# Patient Record
Sex: FEMALE | Race: WHITE | NOT HISPANIC OR LATINO | Employment: PART TIME | URBAN - METROPOLITAN AREA
[De-identification: names, ages, dates, MRNs, and addresses within clinical notes are randomized per-mention and may not be internally consistent; named-entity substitution may affect disease eponyms.]

---

## 2017-12-07 ENCOUNTER — OFFICE VISIT (OUTPATIENT)
Dept: ORTHOPEDIC SURGERY | Facility: CLINIC | Age: 42
End: 2017-12-07

## 2017-12-07 VITALS
HEART RATE: 64 BPM | BODY MASS INDEX: 44.36 KG/M2 | WEIGHT: 276 LBS | DIASTOLIC BLOOD PRESSURE: 84 MMHG | HEIGHT: 66 IN | SYSTOLIC BLOOD PRESSURE: 127 MMHG

## 2017-12-07 DIAGNOSIS — M25.512 ACUTE PAIN OF LEFT SHOULDER: Primary | ICD-10-CM

## 2017-12-07 PROCEDURE — 99203 OFFICE O/P NEW LOW 30 MIN: CPT | Performed by: ORTHOPAEDIC SURGERY

## 2017-12-07 RX ORDER — FUROSEMIDE 20 MG/1
TABLET ORAL
COMMUNITY
Start: 2017-12-05

## 2017-12-07 RX ORDER — MELOXICAM 15 MG/1
TABLET ORAL
COMMUNITY
Start: 2015-10-26 | End: 2018-01-10 | Stop reason: SDUPTHER

## 2017-12-07 RX ORDER — ERGOCALCIFEROL 1.25 MG/1
CAPSULE ORAL
COMMUNITY
Start: 2017-12-03 | End: 2020-03-24

## 2017-12-07 RX ORDER — FLUTICASONE PROPIONATE 50 MCG
SPRAY, SUSPENSION (ML) NASAL
COMMUNITY
Start: 2017-12-06 | End: 2020-03-24

## 2017-12-07 NOTE — PROGRESS NOTES
Subjective: Left shoulder pain     Patient ID: Linda Cardoza is a 42 y.o. female.    Chief Complaint:    History of Present Illness 42-year-old female is seen for left nondominant shoulder she's in she injured on-the-job back in October 25 of this year.  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  Was using the left arm more than the right arm.  She artery has nerve damage in that right upper extremity.  The event after this episode she's had persistent pain and discomfort in the shoulder.  Was seen initially at [facility] x-rayed and told she may have acromioclavicular separation but no treatment was rendered.  Since seen by her primary care physician and then referred here.  He is had no treatment since the injury she persists in having pain discomfort and is unable to perform all of her activities of daily living and work.  Has been restricted in her ability use the left upper extremity.       Social History     Occupational History   • Not on file.     Social History Main Topics   • Smoking status: Never Smoker   • Smokeless tobacco: Never Used   • Alcohol use No   • Drug use: Not on file   • Sexual activity: Not on file      Review of Systems   Constitutional: Negative for chills, diaphoresis, fever and unexpected weight change.   HENT: Negative for hearing loss, nosebleeds, sore throat and tinnitus.    Eyes: Negative for pain and visual disturbance.   Respiratory: Negative for cough, shortness of breath and wheezing.    Cardiovascular: Negative for chest pain and palpitations.   Gastrointestinal: Negative for abdominal pain, diarrhea, nausea and vomiting.   Endocrine: Negative for cold intolerance, heat intolerance and polydipsia.   Genitourinary: Negative for difficulty urinating, dysuria and hematuria.   Musculoskeletal: Positive for arthralgias. Negative for joint swelling and  myalgias.   Skin: Positive for wound. Negative for rash.   Allergic/Immunologic: Negative for environmental allergies.   Neurological: Positive for numbness. Negative for dizziness and syncope.   Hematological: Does not bruise/bleed easily.   Psychiatric/Behavioral: Negative for dysphoric mood and sleep disturbance. The patient is not nervous/anxious.    All other systems reviewed and are negative.        Past Medical History:   Diagnosis Date   • MRSA (methicillin resistant staph aureus) culture positive    • Obesity      Past Surgical History:   Procedure Laterality Date   •  SECTION     • CHOLECYSTECTOMY       Family History   Problem Relation Age of Onset   • Heart disease Father          Objective:  Vitals:    17 1432   BP: 127/84   Pulse: 64     Last 3 weights    17  1432   Weight: 125 kg (276 lb)     Body mass index is 44.55 kg/(m^2).       Ortho Exam  AP and oblique view of the left shoulder done at [facility] brought with her reviewed by me shows no acute or chronic changes.  The acromioclavicular joint does look slightly widened is no definite acute changes noted no prior x-rays are comparison.  She is alert and oriented ×3.  Head is normocephalic sclerae clear.  Left upper extremity has no motor deficit no sensory loss.  Good distal pulses good capillary refill.  No swelling is noted to the upper or lower arm.  She is full abduction and extension there is pain and discomfort with abduction and extension and 90° against resistance.  External rotation is limited to about 40°.  Internal rotation about 50°.  Skin is cool to touch.  Biceps function is 5 over 5.  Deltoid function is +3 out of 5 secondary to pain.  Negative [place]'s test.  Mildly positive Brizuela sign.    Assessment:       1. Acute pain of left shoulder          Plan:      placed in the patient's physical findings and x-ray findings and recommend an MRI.  Return to see me after that is been completed.  Continue the  same work restrictions until seen.      Work Status:    EPI query complete.    Orders:  Orders Placed This Encounter   Procedures   • MRI Shoulder Left Without Contrast       Medications:  New Medications Ordered This Visit   Medications   • gabapentin, once-daily, (GRALISE) 600 MG tablet tablet     Sig: Take  by mouth.   • meloxicam (MOBIC) 15 MG tablet     Sig: Take  by mouth.   • vitamin D (ERGOCALCIFEROL) 49776 units capsule capsule   • fluticasone (FLONASE) 50 MCG/ACT nasal spray   • furosemide (LASIX) 20 MG tablet       Followup:  Return in about 2 weeks (around 12/21/2017).          Dragon transcription disclaimer     Much of this encounter note is an electronic transcription/translation of spoken language to printed text. The electronic translation of spoken language may permit erroneous, or at times, nonsensical words or phrases to be inadvertently transcribed. Although I have reviewed the note for such errors, some may still exist.

## 2018-01-10 ENCOUNTER — OFFICE VISIT (OUTPATIENT)
Dept: ORTHOPEDIC SURGERY | Facility: CLINIC | Age: 43
End: 2018-01-10

## 2018-01-10 VITALS — BODY MASS INDEX: 45.29 KG/M2 | WEIGHT: 271.8 LBS | HEIGHT: 65 IN

## 2018-01-10 DIAGNOSIS — M75.42 IMPINGEMENT SYNDROME OF SHOULDER REGION, LEFT: ICD-10-CM

## 2018-01-10 DIAGNOSIS — M25.512 ACUTE PAIN OF LEFT SHOULDER: Primary | ICD-10-CM

## 2018-01-10 PROCEDURE — 99214 OFFICE O/P EST MOD 30 MIN: CPT | Performed by: ORTHOPAEDIC SURGERY

## 2018-01-10 PROCEDURE — 20610 DRAIN/INJ JOINT/BURSA W/O US: CPT | Performed by: ORTHOPAEDIC SURGERY

## 2018-01-10 RX ORDER — MELOXICAM 15 MG/1
15 TABLET ORAL DAILY
Qty: 30 TABLET | Refills: 5 | Status: SHIPPED | OUTPATIENT
Start: 2018-01-10 | End: 2018-07-17 | Stop reason: SDUPTHER

## 2018-01-10 RX ORDER — TRIAMCINOLONE ACETONIDE 40 MG/ML
40 INJECTION, SUSPENSION INTRA-ARTICULAR; INTRAMUSCULAR
Status: COMPLETED | OUTPATIENT
Start: 2018-01-10 | End: 2018-01-10

## 2018-01-10 RX ORDER — LIDOCAINE HYDROCHLORIDE 10 MG/ML
4 INJECTION, SOLUTION EPIDURAL; INFILTRATION; INTRACAUDAL; PERINEURAL
Status: COMPLETED | OUTPATIENT
Start: 2018-01-10 | End: 2018-01-10

## 2018-01-10 RX ORDER — HYDROCODONE BITARTRATE AND ACETAMINOPHEN 5; 325 MG/1; MG/1
1 TABLET ORAL EVERY 6 HOURS PRN
Qty: 24 TABLET | Refills: 0 | Status: SHIPPED | OUTPATIENT
Start: 2018-01-10 | End: 2020-03-24

## 2018-01-10 RX ADMIN — TRIAMCINOLONE ACETONIDE 40 MG: 40 INJECTION, SUSPENSION INTRA-ARTICULAR; INTRAMUSCULAR at 15:56

## 2018-01-10 RX ADMIN — LIDOCAINE HYDROCHLORIDE 4 ML: 10 INJECTION, SOLUTION EPIDURAL; INFILTRATION; INTRACAUDAL; PERINEURAL at 15:56

## 2018-01-10 NOTE — PROGRESS NOTES
Subjective: Left shoulder pain     Patient ID: Linda Cardoza is a 42 y.o. female.    Chief Complaint:    History of Present Illness 42-year-old female returns with results of the MRI done at an outside.  I personally reviewed the images and the report which shows impingement.  She has a type II acromion subacromial bursitis and tendinitis but no rotator cuff tear.  She persists in having moderate to severe pain and discomfort all activities particularly any overhead or lifting activity.  She stopped taking the meloxicam and she ran out of her.  She's had no other treatment to that shoulder since the injury back in October.       Social History     Occupational History   • Not on file.     Social History Main Topics   • Smoking status: Never Smoker   • Smokeless tobacco: Never Used   • Alcohol use No   • Drug use: No   • Sexual activity: Defer      Review of Systems   Constitutional: Negative for chills, diaphoresis, fever and unexpected weight change.   HENT: Negative for hearing loss, nosebleeds, sore throat and tinnitus.    Eyes: Negative for pain and visual disturbance.   Respiratory: Negative for cough, shortness of breath and wheezing.    Cardiovascular: Negative for chest pain and palpitations.   Gastrointestinal: Negative for abdominal pain, diarrhea, nausea and vomiting.   Endocrine: Negative for cold intolerance, heat intolerance and polydipsia.   Genitourinary: Negative for difficulty urinating, dysuria and hematuria.   Musculoskeletal: Positive for arthralgias and myalgias. Negative for joint swelling.   Skin: Negative for rash and wound.   Allergic/Immunologic: Negative for environmental allergies.   Neurological: Negative for dizziness, syncope and numbness.   Hematological: Does not bruise/bleed easily.   Psychiatric/Behavioral: Negative for dysphoric mood and sleep disturbance. The patient is not nervous/anxious.          Past Medical History:   Diagnosis Date   • MRSA (methicillin resistant  staph aureus) culture positive    • Obesity      Past Surgical History:   Procedure Laterality Date   •  SECTION     • CHOLECYSTECTOMY       Family History   Problem Relation Age of Onset   • Heart disease Father          Objective:  There were no vitals filed for this visit.  Last 3 weights    01/10/18  1538   Weight: 123 kg (271 lb 12.8 oz)     Body mass index is 45.23 kg/(m^2).       Ortho Exam  she is alert and oriented ×3.  I personally reviewed the MRI images and the report done at an outside facility which showed a type II acromion with impingement and bursitis and tendinitis but no rotator cuff tear.  Left upper extremity again shows no motor deficit good distal pulses.  Distal ulnar radial median function is intact.  No sensory loss.  The skin is cool to touch.  His no swelling erythema noted to the shoulder the skin.  Deltoid function is +4 out of 5 secondary to pain.  Biceps function is 5 over 5.  She has 170° of extension.  160° of abduction.  Abduction and extension against resistance at 90° is intact and mildly positive.  Positive Brizuela sign.  External rotation 40°.  Internal rotation 30°.  No swelling to the upper extremity indicating any type of vascular compromise.  His no pain with passive range of motion of her elbow shoulder or her wrist.  Tolerating meloxicam without any GI side effects    Assessment:       1. Acute pain of left shoulder    2. Impingement syndrome of shoulder region, left          Plan: I spent over 15 minutes with the patient reviewing her MRI report results and her physical findings.  Believe her dealing with impingement secondary to the type II acromion.  All of this is been aggravated by the work-related injury.  After discussing treatment options and proceed with an injection of the subacromial space with 4 cc lidocaine 1 cc Kenalog and that was accomplished through the posterior portal after sterile prep without complications tolerating that well.  Also will  begin physical therapy restart the meloxicam gave her prescription for low-dose hydrocodone for pain control.  She is able return to work with no use of the left upper extremity she is to return to see me in 3 weeks.  Discussed this treatment plan with the patient and she was in agreement  Large Joint Arthrocentesis  Date/Time: 1/10/2018 3:56 PM  Consent given by: patient  Site marked: site marked  Timeout: Immediately prior to procedure a time out was called to verify the correct patient, procedure, equipment, support staff and site/side marked as required   Supporting Documentation  Indications: pain   Procedure Details  Location: shoulder - L subacromial bursa  Preparation: Patient was prepped and draped in the usual sterile fashion  Needle size: 22 G  Approach: posterior  Medications administered: 4 mL lidocaine PF 1% 1 %; 40 mg triamcinolone acetonide 40 MG/ML  Patient tolerance: patient tolerated the procedure well with no immediate complications                  Work Status:    EPI query complete.    Orders:  Orders Placed This Encounter   Procedures   • Large Joint Arthrocentesis   • Ambulatory Referral to Physical Therapy Evaluate and treat       Medications:  New Medications Ordered This Visit   Medications   • HYDROcodone-acetaminophen (NORCO) 5-325 MG per tablet     Sig: Take 1 tablet by mouth Every 6 (Six) Hours As Needed for Moderate Pain  or Severe Pain .     Dispense:  24 tablet     Refill:  0   • meloxicam (MOBIC) 15 MG tablet     Sig: Take 1 tablet by mouth Daily.     Dispense:  30 tablet     Refill:  5       Followup:  Return in about 3 weeks (around 1/31/2018).          Dragon transcription disclaimer     Much of this encounter note is an electronic transcription/translation of spoken language to printed text. The electronic translation of spoken language may permit erroneous, or at times, nonsensical words or phrases to be inadvertently transcribed. Although I have reviewed the note for such  errors, some may still exist.

## 2018-03-07 ENCOUNTER — OFFICE VISIT (OUTPATIENT)
Dept: ORTHOPEDIC SURGERY | Facility: CLINIC | Age: 43
End: 2018-03-07

## 2018-03-07 DIAGNOSIS — M75.42 IMPINGEMENT SYNDROME OF SHOULDER REGION, LEFT: ICD-10-CM

## 2018-03-07 DIAGNOSIS — M25.512 ACUTE PAIN OF LEFT SHOULDER: Primary | ICD-10-CM

## 2018-03-07 PROCEDURE — 99213 OFFICE O/P EST LOW 20 MIN: CPT | Performed by: ORTHOPAEDIC SURGERY

## 2018-03-07 RX ORDER — PHENTERMINE HYDROCHLORIDE 30 MG/1
30 CAPSULE ORAL EVERY MORNING
COMMUNITY
End: 2020-03-24

## 2018-03-07 RX ORDER — POTASSIUM CHLORIDE 750 MG/1
TABLET, FILM COATED, EXTENDED RELEASE ORAL
COMMUNITY
Start: 2018-03-05 | End: 2020-03-24

## 2018-03-07 RX ORDER — HYDROCHLOROTHIAZIDE 50 MG/1
TABLET ORAL
COMMUNITY
Start: 2018-02-08

## 2018-03-07 RX ORDER — OMEPRAZOLE 20 MG/1
CAPSULE, DELAYED RELEASE ORAL
COMMUNITY
Start: 2018-01-31 | End: 2020-03-24

## 2018-03-07 RX ORDER — GABAPENTIN 600 MG/1
TABLET ORAL
COMMUNITY
Start: 2018-03-02

## 2018-03-07 RX ORDER — CITALOPRAM 10 MG/1
20 TABLET ORAL
COMMUNITY
Start: 2018-03-02 | End: 2020-03-24

## 2018-03-07 NOTE — PROGRESS NOTES
Subjective: Left shoulder pain     Patient ID: Linda Cardoza is a 42 y.o. female.    Chief Complaint:    History of Present Illness patient seen today for the first time since January regarding impingement of the left shoulder.  According to the patient she has not been seen prior to today because of multiple personal problems he states her shoulder is significantly improved causing minimal to no pain.  She has been taking the meloxicam as needed for discomfort.       Social History     Occupational History   • Not on file.     Social History Main Topics   • Smoking status: Never Smoker   • Smokeless tobacco: Never Used   • Alcohol use No   • Drug use: No   • Sexual activity: Defer      Review of Systems   Constitutional: Negative for chills, diaphoresis, fever and unexpected weight change.   HENT: Negative for hearing loss, nosebleeds, sore throat and tinnitus.    Eyes: Negative for pain and visual disturbance.   Respiratory: Negative for cough, shortness of breath and wheezing.    Cardiovascular: Negative for chest pain and palpitations.   Gastrointestinal: Negative for abdominal pain, diarrhea, nausea and vomiting.   Endocrine: Negative for cold intolerance, heat intolerance and polydipsia.   Genitourinary: Negative for difficulty urinating, dysuria and hematuria.   Musculoskeletal: Negative for arthralgias, joint swelling and myalgias.   Skin: Negative for rash and wound.   Allergic/Immunologic: Negative for environmental allergies.   Neurological: Negative for dizziness, syncope and numbness.   Hematological: Does not bruise/bleed easily.   Psychiatric/Behavioral: Negative for dysphoric mood and sleep disturbance. The patient is not nervous/anxious.    All other systems reviewed and are negative.          Objective:     Ortho Exam  she is alert and oriented ×3.  She has 180° of extension and 180° of abduction with a negative drop arm test.  Negative Brizuela sign.  External rotation is 45° and internal  rotation is 50°.  Deltoid function is 5 over 5 biceps function is 5 over 5.  Distal radial, ulnar and median nerve function intact in his no sensory loss.  She has good distal pulses no motor deficit.  His no swelling noted to the upper or lower arm indicating vascular compromise and she has good capillary refill.  The skin is cool to touch.  She can tolerating meloxicam without any GI side effects.    Assessment:       1. Acute pain of left shoulder    2. Impingement syndrome of shoulder region, left          Plan:        At this point the patient is fully recovered from her injury and at her request release in her for full duty with no restrictions as of today.  She will return to see me as needed regarding the shoulder.  She did discuss today that she is having some problems with her knees unrelated to any work injury and she'll schedule follow-up appointment regarding the knees.

## 2018-04-05 DIAGNOSIS — M25.512 ACUTE PAIN OF LEFT SHOULDER: ICD-10-CM

## 2018-07-17 RX ORDER — MELOXICAM 15 MG/1
TABLET ORAL
Qty: 30 TABLET | Refills: 4 | Status: SHIPPED | OUTPATIENT
Start: 2018-07-17 | End: 2018-12-27 | Stop reason: SDUPTHER

## 2018-12-27 RX ORDER — MELOXICAM 15 MG/1
TABLET ORAL
Qty: 30 TABLET | Refills: 3 | Status: SHIPPED | OUTPATIENT
Start: 2018-12-27 | End: 2019-09-12 | Stop reason: SDUPTHER

## 2019-02-06 ENCOUNTER — OFFICE VISIT (OUTPATIENT)
Dept: ORTHOPEDIC SURGERY | Facility: CLINIC | Age: 44
End: 2019-02-06

## 2019-02-06 VITALS — BODY MASS INDEX: 42.75 KG/M2 | WEIGHT: 266 LBS | HEIGHT: 66 IN

## 2019-02-06 DIAGNOSIS — M25.512 ACUTE PAIN OF LEFT SHOULDER: Primary | ICD-10-CM

## 2019-02-06 DIAGNOSIS — M75.42 IMPINGEMENT SYNDROME OF SHOULDER REGION, LEFT: ICD-10-CM

## 2019-02-06 PROCEDURE — 99213 OFFICE O/P EST LOW 20 MIN: CPT | Performed by: ORTHOPAEDIC SURGERY

## 2019-02-06 NOTE — PROGRESS NOTES
Subjective: Left shoulder pain     Patient ID: Linda Cardoza is a 43 y.o. female.    Chief Complaint:    History of Present Illness 43-year-old female returns with her left shoulder still symptomatic.  The cortisone injection given last for about 3-4 months and all the pain is returns she is having significant discomfort that particularly any overhead activities.  She does work as a  states carrying trays and other type activities are extremely painful.  She continue taking the meloxicam no improvement of her symptoms.  She is also seen a chiropractor for chronic back problems.  Again an MRI done December 2017 showed type II acromion with impingement       Social History     Occupational History   • Not on file   Tobacco Use   • Smoking status: Never Smoker   • Smokeless tobacco: Never Used   Substance and Sexual Activity   • Alcohol use: No   • Drug use: No   • Sexual activity: Defer      Review of Systems   Constitutional: Negative for chills, diaphoresis, fever and unexpected weight change.   HENT: Negative for hearing loss, nosebleeds, sore throat and tinnitus.    Eyes: Negative for pain and visual disturbance.   Respiratory: Negative for cough, shortness of breath and wheezing.    Cardiovascular: Negative for chest pain and palpitations.   Gastrointestinal: Negative for abdominal pain, diarrhea, nausea and vomiting.   Endocrine: Negative for cold intolerance, heat intolerance and polydipsia.   Genitourinary: Negative for difficulty urinating, dysuria and hematuria.   Musculoskeletal: Positive for myalgias. Negative for arthralgias and joint swelling.   Skin: Negative for rash and wound.   Allergic/Immunologic: Negative for environmental allergies.   Neurological: Negative for dizziness, syncope and numbness.   Hematological: Does not bruise/bleed easily.   Psychiatric/Behavioral: Negative for dysphoric mood and sleep disturbance. The patient is not nervous/anxious.          Past Medical History:    Diagnosis Date   • MRSA (methicillin resistant staph aureus) culture positive    • Obesity      Past Surgical History:   Procedure Laterality Date   •  SECTION     • CHOLECYSTECTOMY       Family History   Problem Relation Age of Onset   • Heart disease Father          Objective:  There were no vitals filed for this visit.      19  1600   Weight: 121 kg (266 lb)     Body mass index is 42.93 kg/m².        Ortho Exam   she is alert and oriented ×3.  She has full abduction and extension almost 170° with mild pain.  Negative drop arm test.  Mildly positive Brizuela sign.  Negative speed test.  Mildly positive [place]'s test.  Negative [place]'s test.  Deltoid function is 4 out of 5 secondary to pain.  Biceps function 5 out of 5.  External rotation is approximate 40° and internal rotation is less than 30° secondary to pain.  She has no motor or sensory deficit.    Assessment:        1. Acute pain of left shoulder    2. Impingement syndrome of shoulder region, left           Plan: The MRI confirmed a type II acromion with mild impingement and she failed cortisone injection.  Under defer to Dr. Sheehan to see if he believes the patient is a candidate for arthroscopic subacromial decompression.            Work Status:    EPI query complete.    Orders:  No orders of the defined types were placed in this encounter.      Medications:  No orders of the defined types were placed in this encounter.      Followup:  Return if symptoms worsen or fail to improve.          Dictated utilizing Dragon dictation

## 2019-02-19 ENCOUNTER — OFFICE VISIT (OUTPATIENT)
Dept: ORTHOPEDIC SURGERY | Facility: CLINIC | Age: 44
End: 2019-02-19

## 2019-02-19 VITALS — HEIGHT: 66 IN | WEIGHT: 266 LBS | BODY MASS INDEX: 42.75 KG/M2

## 2019-02-19 DIAGNOSIS — M75.82 TENDINITIS OF LEFT ROTATOR CUFF: ICD-10-CM

## 2019-02-19 DIAGNOSIS — M75.42 SUBACROMIAL IMPINGEMENT OF LEFT SHOULDER: ICD-10-CM

## 2019-02-19 DIAGNOSIS — M19.019 AC JOINT ARTHROPATHY: ICD-10-CM

## 2019-02-19 DIAGNOSIS — R52 PAIN: Primary | ICD-10-CM

## 2019-02-19 PROCEDURE — 20605 DRAIN/INJ JOINT/BURSA W/O US: CPT | Performed by: ORTHOPAEDIC SURGERY

## 2019-02-19 PROCEDURE — 73030 X-RAY EXAM OF SHOULDER: CPT | Performed by: ORTHOPAEDIC SURGERY

## 2019-02-19 PROCEDURE — 99214 OFFICE O/P EST MOD 30 MIN: CPT | Performed by: ORTHOPAEDIC SURGERY

## 2019-02-19 RX ADMIN — TRIAMCINOLONE ACETONIDE 40 MG: 40 INJECTION, SUSPENSION INTRA-ARTICULAR; INTRAMUSCULAR at 15:55

## 2019-02-19 RX ADMIN — LIDOCAINE HYDROCHLORIDE 2 ML: 10 INJECTION, SOLUTION EPIDURAL; INFILTRATION; INTRACAUDAL; PERINEURAL at 15:55

## 2019-03-04 PROBLEM — M19.019 AC JOINT ARTHROPATHY: Status: ACTIVE | Noted: 2019-03-04

## 2019-03-04 PROBLEM — M75.82 TENDINITIS OF LEFT ROTATOR CUFF: Status: ACTIVE | Noted: 2019-03-04

## 2019-03-04 PROBLEM — M75.42 SUBACROMIAL IMPINGEMENT OF LEFT SHOULDER: Status: ACTIVE | Noted: 2019-03-04

## 2019-03-04 RX ORDER — TRIAMCINOLONE ACETONIDE 40 MG/ML
40 INJECTION, SUSPENSION INTRA-ARTICULAR; INTRAMUSCULAR
Status: COMPLETED | OUTPATIENT
Start: 2019-02-19 | End: 2019-02-19

## 2019-03-04 RX ORDER — LIDOCAINE HYDROCHLORIDE 10 MG/ML
2 INJECTION, SOLUTION EPIDURAL; INFILTRATION; INTRACAUDAL; PERINEURAL
Status: COMPLETED | OUTPATIENT
Start: 2019-02-19 | End: 2019-02-19

## 2019-03-12 ENCOUNTER — TELEPHONE (OUTPATIENT)
Dept: ORTHOPEDIC SURGERY | Facility: CLINIC | Age: 44
End: 2019-03-12

## 2019-03-12 DIAGNOSIS — M75.82 TENDINITIS OF LEFT ROTATOR CUFF: ICD-10-CM

## 2019-03-12 DIAGNOSIS — M75.42 SUBACROMIAL IMPINGEMENT OF LEFT SHOULDER: Primary | ICD-10-CM

## 2019-03-12 DIAGNOSIS — M19.019 AC JOINT ARTHROPATHY: ICD-10-CM

## 2019-03-12 NOTE — TELEPHONE ENCOUNTER
Would you mind to put a PT order in for this patient?  Said she was told to start back with PT at last visit but there isn't an order.        Fax to [facility]

## 2019-04-02 ENCOUNTER — OFFICE VISIT (OUTPATIENT)
Dept: ORTHOPEDIC SURGERY | Facility: CLINIC | Age: 44
End: 2019-04-02

## 2019-04-02 DIAGNOSIS — M19.019 AC JOINT ARTHROPATHY: Primary | ICD-10-CM

## 2019-04-02 DIAGNOSIS — M75.82 TENDINITIS OF LEFT ROTATOR CUFF: ICD-10-CM

## 2019-04-02 PROCEDURE — 99213 OFFICE O/P EST LOW 20 MIN: CPT | Performed by: ORTHOPAEDIC SURGERY

## 2019-04-02 NOTE — PROGRESS NOTES
Subjective:     Patient ID: Linda Cardoza is a 43 y.o. female.    Chief Complaint:  Follow-up left shoulder pain  History of Present Illness  Linda Cardoza returns to clinic today for evaluation of left shoulder, states that she has not started to physical therapy at this point time, she had approximately 40% relief of her pain for 5-7 days with prior AC joint injection at last visit 6 weeks ago.  Since then pain is continued to increase again primarily localized over the superior and anterior aspect of her left shoulder, exacerbated primarily with cross arm and overhead activities especially with resistance, rates now as a 6-8 out of 10 and aching in nature, occasional sharp pain noted.  Denies radiation of pain, denies associated numbness or tingling.     Social History     Occupational History   • Not on file   Tobacco Use   • Smoking status: Never Smoker   • Smokeless tobacco: Never Used   Substance and Sexual Activity   • Alcohol use: No   • Drug use: No   • Sexual activity: Defer      Past Medical History:   Diagnosis Date   • MRSA (methicillin resistant staph aureus) culture positive    • Obesity      Past Surgical History:   Procedure Laterality Date   •  SECTION     • CHOLECYSTECTOMY         Family History   Problem Relation Age of Onset   • Heart disease Father          Review of Systems   Constitutional: Negative for chills, diaphoresis, fever and unexpected weight change.   HENT: Negative for hearing loss, nosebleeds, sore throat and tinnitus.    Eyes: Negative for pain and visual disturbance.   Respiratory: Negative for cough, shortness of breath and wheezing.    Cardiovascular: Negative for chest pain and palpitations.   Gastrointestinal: Negative for abdominal pain, diarrhea, nausea and vomiting.   Endocrine: Negative for cold intolerance, heat intolerance and polydipsia.   Genitourinary: Negative for difficulty urinating, dysuria and hematuria.   Musculoskeletal: Positive for  arthralgias. Negative for joint swelling and myalgias.   Skin: Negative for rash and wound.   Allergic/Immunologic: Negative for environmental allergies.   Neurological: Negative for dizziness, syncope and numbness.   Hematological: Does not bruise/bleed easily.   Psychiatric/Behavioral: Negative for dysphoric mood and sleep disturbance. The patient is not nervous/anxious.    All other systems reviewed and are negative.          Objective:  There were no vitals filed for this visit.  There were no vitals filed for this visit.  There is no height or weight on file to calculate BMI.  General: No acute distress.  Resp: normal respiratory effort  Skin: no rashes or wounds; normal turgor  Psych: mood and affect appropriate; recent and remote memory intact          Ortho Exam     Left shoulder-active forward flexion 175 degrees, external rotation 55 degrees, internal rotation T12, 4+ out of 5 strength on resistance in all planes, negative bear hug sign.  Maximal tenderness over AC joint with positive crossarm test.  Moderately positive [place] and speeds with equivocal [place]'s.  Minimally positive empty can test, mildly positive Brizuela and [place]'s.  Imaging:    Assessment:        1. AC joint arthropathy    2. Tendinitis of left rotator cuff           Plan:          Discussed treatment options at length with patient at today's visit.  Given persistence of symptoms we discussed options and patient was in agreement with proceeding with ultrasound-guided injection to the left AC joint for diagnostic as well as therapeutic purposes.  Referral placed to sports medicine office for this today.  Follow-up in 6 weeks after injection to assess for improvement.  If no significant improvement with that may consider repeat MRI to evaluate for any interval changes in regards to rotator cuff as well as biceps tendon insertion.    Linda Cardoza was in agreement with plan and had all questions answered.     Orders:  Orders Placed  This Encounter   Procedures   • Ambulatory Referral to Sports Medicine       Medications:  No orders of the defined types were placed in this encounter.      Followup:  Return in about 6 weeks (around 5/14/2019).    Linda was seen today for follow-up and pain.    Diagnoses and all orders for this visit:    AC joint arthropathy  -     Ambulatory Referral to Sports Medicine    Tendinitis of left rotator cuff          Dictated utilizing Dragon dictation

## 2019-05-21 ENCOUNTER — TELEPHONE (OUTPATIENT)
Dept: ORTHOPEDIC SURGERY | Facility: CLINIC | Age: 44
End: 2019-05-21

## 2019-05-21 NOTE — TELEPHONE ENCOUNTER
Referral to Dr Abraham:   office notes/request for referral auth has been faxed to work comp 4 times (4/10, 4/26, 5/13, 5/21). I have also called and left a voicemail for a status check on 5/13/2019 but have not received anything back from work comp.

## 2019-06-18 ENCOUNTER — OFFICE VISIT (OUTPATIENT)
Dept: ORTHOPEDIC SURGERY | Facility: CLINIC | Age: 44
End: 2019-06-18

## 2019-06-18 VITALS — HEIGHT: 66 IN | BODY MASS INDEX: 42.75 KG/M2 | WEIGHT: 266 LBS

## 2019-06-18 DIAGNOSIS — M19.019 AC JOINT ARTHROPATHY: Primary | ICD-10-CM

## 2019-06-18 DIAGNOSIS — M75.42 SUBACROMIAL IMPINGEMENT OF LEFT SHOULDER: ICD-10-CM

## 2019-06-18 DIAGNOSIS — M75.82 TENDINITIS OF LEFT ROTATOR CUFF: ICD-10-CM

## 2019-06-18 PROCEDURE — 99213 OFFICE O/P EST LOW 20 MIN: CPT | Performed by: ORTHOPAEDIC SURGERY

## 2019-09-12 RX ORDER — MELOXICAM 15 MG/1
TABLET ORAL
Qty: 30 TABLET | Refills: 2 | Status: SHIPPED | OUTPATIENT
Start: 2019-09-12

## 2020-03-24 ENCOUNTER — HOSPITAL ENCOUNTER (EMERGENCY)
Facility: HOSPITAL | Age: 45
Discharge: HOME | End: 2020-03-24
Attending: EMERGENCY MEDICINE | Admitting: EMERGENCY MEDICINE

## 2020-03-24 ENCOUNTER — APPOINTMENT (OUTPATIENT)
Dept: GENERAL RADIOLOGY | Facility: HOSPITAL | Age: 45
End: 2020-03-24

## 2020-03-24 VITALS
OXYGEN SATURATION: 98 % | SYSTOLIC BLOOD PRESSURE: 117 MMHG | BODY MASS INDEX: 48.82 KG/M2 | DIASTOLIC BLOOD PRESSURE: 77 MMHG | TEMPERATURE: 98.8 F | HEART RATE: 81 BPM | HEIGHT: 65 IN | RESPIRATION RATE: 16 BRPM | WEIGHT: 293 LBS

## 2020-03-24 DIAGNOSIS — R53.81 MALAISE AND FATIGUE: ICD-10-CM

## 2020-03-24 DIAGNOSIS — J06.9 UPPER RESPIRATORY TRACT INFECTION, UNSPECIFIED TYPE: Primary | ICD-10-CM

## 2020-03-24 DIAGNOSIS — R53.83 MALAISE AND FATIGUE: ICD-10-CM

## 2020-03-24 LAB
ALBUMIN SERPL-MCNC: 3.9 G/DL (ref 3.5–5.2)
ALBUMIN/GLOB SERPL: 1.2 G/DL
ALP SERPL-CCNC: 80 U/L (ref 39–117)
ALT SERPL W P-5'-P-CCNC: 17 U/L (ref 1–33)
ANION GAP SERPL CALCULATED.3IONS-SCNC: 12.6 MMOL/L (ref 5–15)
AST SERPL-CCNC: 25 U/L (ref 1–32)
BASOPHILS # BLD AUTO: 0.01 10*3/MM3 (ref 0–0.2)
BASOPHILS NFR BLD AUTO: 0.3 % (ref 0–1.5)
BILIRUB SERPL-MCNC: 0.3 MG/DL (ref 0.2–1.2)
BUN BLD-MCNC: 11 MG/DL (ref 6–20)
BUN/CREAT SERPL: 14.5 (ref 7–25)
CALCIUM SPEC-SCNC: 8.9 MG/DL (ref 8.6–10.5)
CHLORIDE SERPL-SCNC: 101 MMOL/L (ref 98–107)
CO2 SERPL-SCNC: 24.4 MMOL/L (ref 22–29)
CREAT BLD-MCNC: 0.76 MG/DL (ref 0.57–1)
DEPRECATED RDW RBC AUTO: 43.6 FL (ref 37–54)
EOSINOPHIL # BLD AUTO: 0.01 10*3/MM3 (ref 0–0.4)
EOSINOPHIL NFR BLD AUTO: 0.3 % (ref 0.3–6.2)
ERYTHROCYTE [DISTWIDTH] IN BLOOD BY AUTOMATED COUNT: 13.6 % (ref 12.3–15.4)
FLUAV AG NPH QL: NEGATIVE
FLUBV AG NPH QL IA: NEGATIVE
GFR SERPL CREATININE-BSD FRML MDRD: 82 ML/MIN/1.73
GLOBULIN UR ELPH-MCNC: 3.3 GM/DL
GLUCOSE BLD-MCNC: 94 MG/DL (ref 65–99)
HCT VFR BLD AUTO: 40 % (ref 34–46.6)
HETEROPH AB SER QL LA: NEGATIVE
HGB BLD-MCNC: 12.8 G/DL (ref 12–15.9)
IMM GRANULOCYTES # BLD AUTO: 0 10*3/MM3 (ref 0–0.05)
IMM GRANULOCYTES NFR BLD AUTO: 0 % (ref 0–0.5)
LYMPHOCYTES # BLD AUTO: 1.26 10*3/MM3 (ref 0.7–3.1)
LYMPHOCYTES NFR BLD AUTO: 34.8 % (ref 19.6–45.3)
MCH RBC QN AUTO: 27.8 PG (ref 26.6–33)
MCHC RBC AUTO-ENTMCNC: 32 G/DL (ref 31.5–35.7)
MCV RBC AUTO: 86.8 FL (ref 79–97)
MONOCYTES # BLD AUTO: 0.32 10*3/MM3 (ref 0.1–0.9)
MONOCYTES NFR BLD AUTO: 8.8 % (ref 5–12)
NEUTROPHILS # BLD AUTO: 2.02 10*3/MM3 (ref 1.7–7)
NEUTROPHILS NFR BLD AUTO: 55.8 % (ref 42.7–76)
NRBC BLD AUTO-RTO: 0 /100 WBC (ref 0–0.2)
NT-PROBNP SERPL-MCNC: 8.2 PG/ML (ref 5–450)
PLATELET # BLD AUTO: 274 10*3/MM3 (ref 140–450)
PMV BLD AUTO: 10.1 FL (ref 6–12)
POTASSIUM BLD-SCNC: 4.4 MMOL/L (ref 3.5–5.2)
PROT SERPL-MCNC: 7.2 G/DL (ref 6–8.5)
RBC # BLD AUTO: 4.61 10*6/MM3 (ref 3.77–5.28)
SODIUM BLD-SCNC: 138 MMOL/L (ref 136–145)
TROPONIN T SERPL-MCNC: <0.01 NG/ML (ref 0–0.03)
WBC NRBC COR # BLD: 3.62 10*3/MM3 (ref 3.4–10.8)

## 2020-03-24 PROCEDURE — 93010 ELECTROCARDIOGRAM REPORT: CPT | Performed by: INTERNAL MEDICINE

## 2020-03-24 PROCEDURE — 99284 EMERGENCY DEPT VISIT MOD MDM: CPT

## 2020-03-24 PROCEDURE — 84484 ASSAY OF TROPONIN QUANT: CPT | Performed by: EMERGENCY MEDICINE

## 2020-03-24 PROCEDURE — 93005 ELECTROCARDIOGRAM TRACING: CPT | Performed by: EMERGENCY MEDICINE

## 2020-03-24 PROCEDURE — 71045 X-RAY EXAM CHEST 1 VIEW: CPT

## 2020-03-24 PROCEDURE — 87804 INFLUENZA ASSAY W/OPTIC: CPT | Performed by: EMERGENCY MEDICINE

## 2020-03-24 PROCEDURE — 80053 COMPREHEN METABOLIC PANEL: CPT | Performed by: EMERGENCY MEDICINE

## 2020-03-24 PROCEDURE — 85025 COMPLETE CBC W/AUTO DIFF WBC: CPT | Performed by: EMERGENCY MEDICINE

## 2020-03-24 PROCEDURE — 99284 EMERGENCY DEPT VISIT MOD MDM: CPT | Performed by: EMERGENCY MEDICINE

## 2020-03-24 PROCEDURE — 86308 HETEROPHILE ANTIBODY SCREEN: CPT | Performed by: EMERGENCY MEDICINE

## 2020-03-24 PROCEDURE — 83880 ASSAY OF NATRIURETIC PEPTIDE: CPT | Performed by: EMERGENCY MEDICINE
